# Patient Record
Sex: FEMALE | Race: OTHER | HISPANIC OR LATINO | Employment: UNEMPLOYED | URBAN - METROPOLITAN AREA
[De-identification: names, ages, dates, MRNs, and addresses within clinical notes are randomized per-mention and may not be internally consistent; named-entity substitution may affect disease eponyms.]

---

## 2024-03-25 PROBLEM — O44.40 LOW-LYING PLACENTA: Status: ACTIVE | Noted: 2024-03-25

## 2024-05-08 ENCOUNTER — ANCILLARY PROCEDURE (OUTPATIENT)
Dept: MATERNAL FETAL MEDICINE | Facility: MEDICAL CENTER | Age: 39
End: 2024-05-08
Attending: OBSTETRICS & GYNECOLOGY

## 2024-05-08 ENCOUNTER — HOSPITAL ENCOUNTER (EMERGENCY)
Facility: MEDICAL CENTER | Age: 39
End: 2024-05-08
Attending: OBSTETRICS & GYNECOLOGY | Admitting: OBSTETRICS & GYNECOLOGY
Payer: MEDICAID

## 2024-05-08 VITALS
WEIGHT: 196 LBS | DIASTOLIC BLOOD PRESSURE: 68 MMHG | HEART RATE: 89 BPM | BODY MASS INDEX: 30.76 KG/M2 | HEIGHT: 67 IN | TEMPERATURE: 97.7 F | SYSTOLIC BLOOD PRESSURE: 117 MMHG

## 2024-05-08 VITALS
BODY MASS INDEX: 30.7 KG/M2 | WEIGHT: 196 LBS | DIASTOLIC BLOOD PRESSURE: 78 MMHG | HEART RATE: 90 BPM | SYSTOLIC BLOOD PRESSURE: 122 MMHG

## 2024-05-08 DIAGNOSIS — O26.849 FETAL SIZE INCONSISTENT WITH DATES: ICD-10-CM

## 2024-05-08 DIAGNOSIS — O09.522 MULTIGRAVIDA OF ADVANCED MATERNAL AGE IN SECOND TRIMESTER: ICD-10-CM

## 2024-05-08 DIAGNOSIS — O28.3 ABNORMAL PREGNANCY US: ICD-10-CM

## 2024-05-08 DIAGNOSIS — Z98.890 HISTORY OF LOOP ELECTRICAL EXCISION PROCEDURE (LEEP): ICD-10-CM

## 2024-05-08 DIAGNOSIS — Q27.0 TWO VESSEL CORD: ICD-10-CM

## 2024-05-08 DIAGNOSIS — Z98.891 HISTORY OF VBAC: ICD-10-CM

## 2024-05-08 LAB
APPEARANCE UR: CLEAR
COLOR UR AUTO: YELLOW
GLUCOSE UR QL STRIP.AUTO: NEGATIVE MG/DL
KETONES UR QL STRIP.AUTO: NEGATIVE MG/DL
LEUKOCYTE ESTERASE UR QL STRIP.AUTO: NEGATIVE
NITRITE UR QL STRIP.AUTO: NEGATIVE
PH UR STRIP.AUTO: 7 [PH] (ref 5–8)
PROT UR QL STRIP: 30 MG/DL
RBC UR QL AUTO: NEGATIVE
SP GR UR STRIP.AUTO: 1.02 (ref 1–1.03)

## 2024-05-08 PROCEDURE — 99283 EMERGENCY DEPT VISIT LOW MDM: CPT | Performed by: OBSTETRICS & GYNECOLOGY

## 2024-05-08 PROCEDURE — 59025 FETAL NON-STRESS TEST: CPT

## 2024-05-08 PROCEDURE — 81002 URINALYSIS NONAUTO W/O SCOPE: CPT

## 2024-05-08 PROCEDURE — 99282 EMERGENCY DEPT VISIT SF MDM: CPT | Mod: 25

## 2024-05-08 PROCEDURE — 76816 OB US FOLLOW-UP PER FETUS: CPT | Performed by: OBSTETRICS & GYNECOLOGY

## 2024-05-08 ASSESSMENT — PAIN SCALES - GENERAL: PAINLEVEL: 0 - NO PAIN

## 2024-05-08 NOTE — ED PROVIDER NOTES
LABOR & DELIVERY TRIAGE HISTORY AND PHYSICAL  Patient Name: Tuyet Rojo Age/Sex: 38 y.o. female  : 1985 MRN: [id number]      HISTORY OF PRESENT ILLNESS:   Tuyet Rojo is a 38 y.o.  at 29w3d weeks gestation by L unequal to 16w2d ultrasound who was sent over for loose stools for three weeks and night time vomiting for 3 nights.  [id number] used for encounter. The patient reports that she is tolerating food and water for the most part. No blood in stool. No abdominal pain. She denies fevers or chills. She denies sick contacts or unusual food selections.     Fetal movement: yes  Leakage of Fluid: no  Vaginal Bleeding: no  Contractions: no    Her EDC is 2024, by Ultrasound.   She has received prenatal care with [facility] .  Complications during pregnancy:   Advanced maternal age  Single umbilical artery  Hx of  section followed by a successful     OBSTETRICAL HISTORY:  OB History    Para Term  AB Living   3 2 2     2   SAB IAB Ectopic Molar Multiple Live Births           0 2      # Outcome Date GA Lbr Matt/2nd Weight Sex Delivery Anes PTL Lv   3 Current            2 Term 22 39w3d / 00:50 3.415 kg (7 lb 8.5 oz) M  EPI N DEN   1 Term 19 39w0d  2.268 kg (5 lb) F CS-Unspec EPI N DEN      Birth Comments: C/section due to fetal distress       MEDICAL HISTORY:  Past Medical History:   Diagnosis Date    History of loop electrical excision procedure (LEEP) 2023       SURGICAL HISTORY:  Past Surgical History:   Procedure Laterality Date    PRIMARY C SECTION  2019     [facility]       MEDICATIONS:  Medications Prior to Admission   Medication Sig Dispense Refill Last Dose    metroNIDAZOLE (FLAGYL) 500 MG Tab Take 1 Tablet by mouth 2 times a day. 14 Tablet 0     Prenatal MV-Min-Fe Fum-FA-DHA (PRENATAL 1 PO) Take  by mouth.          ALLERGIES:  Allergies   Allergen Reactions    Penicillins  Swelling    Ketorolac Rash     Rash          SOCIAL HISTORY:   Tobacco use: no  Alcohol use: no  Recreational drug use: no   reports that she has quit smoking. She has never used smokeless tobacco. She reports that she does not currently use alcohol. She reports that she does not use drugs.    FAMILY HISTORY:  Clotting/bleeding disorders: no  Gynecological cancers: no  Family History   Problem Relation Age of Onset    Diabetes Mother     Diabetes Father        REVIEW OF SYSTEMS:  Pertinent items are noted in HPI.      PHYSICAL EXAM:     There is no height or weight on file to calculate BMI.    General:  AAOx3, NAD, pleasant disposition   Pulmonary:  Non-labored respirations   Abdomen: Soft and non-tender, gravid uterus   FHT: 145/mod/+accel/no decel ***   Contractions: No     Bedside Ultrasound:   Not performed     Prenatal Labs:  HepBSAg non-reactive  HIV non-reactive  RPR non-reactive  Rubella immune  ABO A+    Group B Strep: Not yet indicated       ASSESSMENT/ PLAN:   Tuyet Rojo is a 38 y.o.  at 29w3d gestation by L unequal to 16w2d ultrasound who is here for a three week history of loose stools and a three day history of night time vomiting.    1. UA collected and does not show ketones  2. Stool culture ordered  3. She is tolerating oral hydration and crackers currently  4. Anticipate discharge home       Miquel Perea M.D.  Obstetrics and Gynecology  2024    10:50 AM

## 2024-05-08 NOTE — PROGRESS NOTES
EDC - 29 3/7    Pt sent from office for evaluation of vomiting and diarrhea.  used for assessment. Pt reports having loose stools after eating for the past couple of weeks as well as some nausea and vomiting in the evenings. Pt reports good fetal movement, denies vaginal bleeding, leaking of fluid and contractions. EFM/TOCO applied. Report to Dr Perea; provider in room to see pt. Orders received for UA and stool testing. Pt instructed to call RN when able to leave stool sample.    1230- pt states she is still unable to give stool sample. Dr Perea updated. Order received to discharge pt home with instruction to return if diarrhea worsens.     1240- Discharge teaching done with , pt verbalized understanding.

## 2024-05-15 ENCOUNTER — ROUTINE PRENATAL (OUTPATIENT)
Dept: OBGYN | Facility: CLINIC | Age: 39
End: 2024-05-15

## 2024-05-15 VITALS — WEIGHT: 198 LBS | SYSTOLIC BLOOD PRESSURE: 122 MMHG | DIASTOLIC BLOOD PRESSURE: 70 MMHG | BODY MASS INDEX: 31.08 KG/M2

## 2024-05-15 DIAGNOSIS — O09.523 MULTIGRAVIDA OF ADVANCED MATERNAL AGE IN THIRD TRIMESTER: ICD-10-CM

## 2024-05-15 DIAGNOSIS — R19.7 DIARRHEA, UNSPECIFIED TYPE: ICD-10-CM

## 2024-05-15 PROBLEM — O26.849 FETAL SIZE INCONSISTENT WITH DATES: Status: RESOLVED | Noted: 2024-03-21 | Resolved: 2024-05-15

## 2024-05-15 PROCEDURE — 0502F SUBSEQUENT PRENATAL CARE: CPT

## 2024-05-15 PROCEDURE — 3074F SYST BP LT 130 MM HG: CPT

## 2024-05-15 PROCEDURE — 3078F DIAST BP <80 MM HG: CPT

## 2024-05-15 RX ORDER — PRENATAL VIT/IRON FUM/FOLIC AC 27MG-0.8MG
TABLET ORAL
COMMUNITY
Start: 2024-01-16

## 2024-05-15 RX ORDER — FERROUS SULFATE 325(65) MG
325 TABLET ORAL DAILY
Qty: 90 TABLET | Refills: 0 | Status: SHIPPED | OUTPATIENT
Start: 2024-05-15

## 2024-05-15 NOTE — PROGRESS NOTES
"S: Pt is a 38 y.o.  at 30w3d gestation here today for routine prenatal care. Reports still having diarrhea once daily, other BM in a day are \"normal\". Does not have much of an appetite. Went to hospital for stool sample per [facility]'s recommendations, though unable to have BM while there.     Also reporting inability to fall asleep and hemorrhoids.     Pt reports fetal movement; denies cramping, vaginal bleeding, and leaking of fluid.     O: /70   Wt 198 lb    *see prenatal flowsheet*     Labs:        GCT: 150   Ultrasound: EFW 51%, BETITO WNL    A: IUP at 30w3d       S=D         Patient Active Problem List    Diagnosis Date Noted    Two vessel cord 2024    S<D 2024    History of  2024    Multigravida of advanced maternal age in second trimester 2024    History of loop electrical excision procedure (LEEP) 2023     P:   -Discussed normal s/sx pregnancy appropriate for gestational age and labor warning signs vs general discomforts. Reviewed fetal movements appropriate for EGA and kick counts. Reinforced adequate hydration and nutrition.  -Rx for ferrous sulfate  -Disc benadryl for sleep, preparation H for hemorrhoids  -Called lab, pt will go to  sample cup from lab today and return it with stool sample when possible. Lab pending from hospital ([facility]'s recommendations)  -Has f/u scan with [facility]   -RTC in 2 weeks for routine prenatal care      Kerri Schilling C.N.M.    "

## 2024-05-15 NOTE — PROGRESS NOTES
Pt here today for OBFV   +FM movemnet  No VB, LOF. 's   Phone number [phone] (home)   Pharmacy verified   MFM- 6/5  ER- 5/8 for vomiting and diarrhea

## 2024-05-21 ENCOUNTER — HOSPITAL ENCOUNTER (OUTPATIENT)
Facility: MEDICAL CENTER | Age: 39
End: 2024-05-21
Attending: OBSTETRICS & GYNECOLOGY
Payer: COMMERCIAL

## 2024-05-22 LAB
E COLI SXT1+2 STL IA: NORMAL
SIGNIFICANT IND 70042: NORMAL
SITE SITE: NORMAL
SOURCE SOURCE: NORMAL

## 2024-05-24 LAB
BACTERIA STL CULT: NORMAL
E COLI SXT1+2 STL IA: NORMAL
SIGNIFICANT IND 70042: NORMAL
SITE SITE: NORMAL
SOURCE SOURCE: NORMAL

## 2024-05-28 ENCOUNTER — ROUTINE PRENATAL (OUTPATIENT)
Dept: OBGYN | Facility: CLINIC | Age: 39
End: 2024-05-28
Payer: MEDICAID

## 2024-05-28 VITALS — BODY MASS INDEX: 30.92 KG/M2 | SYSTOLIC BLOOD PRESSURE: 118 MMHG | DIASTOLIC BLOOD PRESSURE: 74 MMHG | WEIGHT: 197 LBS

## 2024-05-28 DIAGNOSIS — O09.523 MULTIGRAVIDA OF ADVANCED MATERNAL AGE IN THIRD TRIMESTER: ICD-10-CM

## 2024-05-28 NOTE — PROGRESS NOTES
Pt here today for OB follow up  Pt states she is feeling tired.   Reports +FM  Good # [phone]  Pharmacy Confirmed.  Chaperone offered and not indicated.   Pt has an appt with MFM on 6/5/2024  Pt would like BTL, consent signed

## 2024-05-28 NOTE — PROGRESS NOTES
S: ***    O: Vitals see flows   ***   Pertinent Lab Results: negative stool culture    A: Tuyet Rojo IUP at 32w2d     Patient Active Problem List    Diagnosis Date Noted    Two vessel cord 2024    History of  2024    Multigravida of advanced maternal age in second trimester 2024    History of loop electrical excision procedure (LEEP) 2023      ***    P: Studies/Labs to order today: ***   Studies/Labsfor next visit: ***   MFM scan f/u    Follow-up: 2 wks    Precautions reviewed with patient appropriate for gestational age.

## 2024-05-28 NOTE — PROGRESS NOTES
SUBJECTIVE:  Pt is a 38 y.o.   at 32w2d  gestation. Presents today for follow-up prenatal care. Reports good  fetal movement. Denies regular cramping/contractions, bleeding or leaking of fluid. Denies dysuria, headaches, N/V. Generally feels well today.      OBJECTIVE:  - See prenatal vitals flow  -   Vitals:    24 1019   BP: 118/74   Weight: 197 lb                 ASSESSMENT:   - IUP at 32w2d    - S=D   -   Patient Active Problem List    Diagnosis Date Noted    Two vessel cord 2024    History of  2024    Multigravida of advanced maternal age in third trimester 2024    History of loop electrical excision procedure (LEEP) 2023         PLAN:  - S/sx pregnancy and labor warning signs vs general discomforts discussed  - Fetal movements and/or kick counts reviewed   - Adequate hydration reinforced  - Nutrition/exercise/vitamin education; continue PNV  - Oki follow up in   - Plans BTL for contraception Pp: handout given and reviewed  - Anticipatory guidance given  - RTC in 2 weeks for follow-up prenatal care/TOLAC counseling

## 2024-06-05 ENCOUNTER — ANCILLARY PROCEDURE (OUTPATIENT)
Dept: MATERNAL FETAL MEDICINE | Facility: MEDICAL CENTER | Age: 39
End: 2024-06-05

## 2024-06-05 VITALS
BODY MASS INDEX: 31.09 KG/M2 | HEART RATE: 102 BPM | WEIGHT: 198.1 LBS | SYSTOLIC BLOOD PRESSURE: 100 MMHG | DIASTOLIC BLOOD PRESSURE: 63 MMHG

## 2024-06-05 DIAGNOSIS — Z98.891 HISTORY OF VBAC: ICD-10-CM

## 2024-06-05 DIAGNOSIS — O09.523 ADVANCED MATERNAL AGE IN MULTIGRAVIDA, THIRD TRIMESTER: ICD-10-CM

## 2024-06-05 DIAGNOSIS — Z98.890 HISTORY OF LOOP ELECTRICAL EXCISION PROCEDURE (LEEP): ICD-10-CM

## 2024-06-05 DIAGNOSIS — Q27.0 TWO VESSEL CORD: ICD-10-CM

## 2024-06-05 DIAGNOSIS — O09.523 MULTIGRAVIDA OF ADVANCED MATERNAL AGE IN THIRD TRIMESTER: ICD-10-CM

## 2024-06-05 PROCEDURE — 76816 OB US FOLLOW-UP PER FETUS: CPT | Performed by: OBSTETRICS & GYNECOLOGY

## 2024-06-11 ENCOUNTER — ROUTINE PRENATAL (OUTPATIENT)
Dept: OBGYN | Facility: CLINIC | Age: 39
End: 2024-06-11

## 2024-06-11 VITALS — SYSTOLIC BLOOD PRESSURE: 104 MMHG | DIASTOLIC BLOOD PRESSURE: 60 MMHG | BODY MASS INDEX: 31.14 KG/M2 | WEIGHT: 198.4 LBS

## 2024-06-11 DIAGNOSIS — O34.219 PREVIOUS CESAREAN DELIVERY AFFECTING PREGNANCY, ANTEPARTUM: ICD-10-CM

## 2024-06-11 DIAGNOSIS — O09.523 MULTIGRAVIDA OF ADVANCED MATERNAL AGE IN THIRD TRIMESTER: ICD-10-CM

## 2024-06-11 DIAGNOSIS — O09.899 SINGLE UMBILICAL ARTERY AFFECTING MANAGEMENT OF MOTHER, ANTEPARTUM, SINGLE GESTATION: ICD-10-CM

## 2024-06-11 PROCEDURE — 3074F SYST BP LT 130 MM HG: CPT | Performed by: OBSTETRICS & GYNECOLOGY

## 2024-06-11 PROCEDURE — 3078F DIAST BP <80 MM HG: CPT | Performed by: OBSTETRICS & GYNECOLOGY

## 2024-06-11 PROCEDURE — 0502F SUBSEQUENT PRENATAL CARE: CPT | Performed by: OBSTETRICS & GYNECOLOGY

## 2024-06-11 NOTE — PROGRESS NOTES
OB Visit Note - 34w2d     MEDICAL DECISION MAKING:  Tuyet Rojo is a 38 y.o. female  at 34w2d.  The patient reported good fetal movement, no vaginal bleeding, mild yellowish discharge when not hydrated.  The patient wished to discuss a trial of labor after previous  delivery.  She has had a previously successful .    Today's visit addressed:   1.  Advanced maternal age  2.  Previous  section  3.  Single umbilical artery noted on ultrasound    Pregnancy complicated by:  Patient Active Problem List   Diagnosis    History of     History of loop electrical excision procedure (LEEP)    Multigravida of advanced maternal age in third trimester    Two vessel cord       The patient will follow up in 1 week for repeat OB visit and NST.  With a history of a previous successful , she should be a candidate for another trial of labor as long as her fetal monitoring is reassuring.  She was counseled to call or return for vaginal bleeding, regular contractions, leakage of fluid or decreased fetal movement.    Swapnil Chand M.D.

## 2024-06-11 NOTE — PROGRESS NOTES
Pt. Here for OB/FU.   34w2d  Reports Good FM.   Pt. Denies VB, LOF, or UC's.     Pt states she is getting some yellow and orange discharge no odor or itchiness, but seems to resolve with hydration. No other complaints today     Phone/Pharm verified.      Interpretor ID : [id number]

## 2024-06-18 ENCOUNTER — NON-PROVIDER VISIT (OUTPATIENT)
Dept: OBGYN | Facility: CLINIC | Age: 39
End: 2024-06-18
Payer: MEDICAID

## 2024-06-18 VITALS
DIASTOLIC BLOOD PRESSURE: 72 MMHG | HEIGHT: 67 IN | WEIGHT: 199.2 LBS | SYSTOLIC BLOOD PRESSURE: 127 MMHG | BODY MASS INDEX: 31.27 KG/M2

## 2024-06-18 DIAGNOSIS — O09.523 MULTIGRAVIDA OF ADVANCED MATERNAL AGE IN THIRD TRIMESTER: ICD-10-CM

## 2024-06-18 PROCEDURE — 59025 FETAL NON-STRESS TEST: CPT | Performed by: NURSE PRACTITIONER

## 2024-06-26 ENCOUNTER — ROUTINE PRENATAL (OUTPATIENT)
Dept: OBGYN | Facility: CLINIC | Age: 39
End: 2024-06-26

## 2024-06-26 ENCOUNTER — HOSPITAL ENCOUNTER (OUTPATIENT)
Facility: MEDICAL CENTER | Age: 39
End: 2024-06-26
Payer: COMMERCIAL

## 2024-06-26 ENCOUNTER — NON-PROVIDER VISIT (OUTPATIENT)
Dept: OBGYN | Facility: CLINIC | Age: 39
End: 2024-06-26

## 2024-06-26 VITALS — BODY MASS INDEX: 31.39 KG/M2 | SYSTOLIC BLOOD PRESSURE: 104 MMHG | DIASTOLIC BLOOD PRESSURE: 85 MMHG | WEIGHT: 200 LBS

## 2024-06-26 DIAGNOSIS — Z34.83 PRENATAL CARE, SUBSEQUENT PREGNANCY, THIRD TRIMESTER: ICD-10-CM

## 2024-06-26 PROCEDURE — 3074F SYST BP LT 130 MM HG: CPT

## 2024-06-26 PROCEDURE — 0502F SUBSEQUENT PRENATAL CARE: CPT

## 2024-06-26 PROCEDURE — 3079F DIAST BP 80-89 MM HG: CPT

## 2024-06-26 NOTE — PROGRESS NOTES
S:  Pt is  at 36w3d here for routine OB follow up. Reports she has been under a lot of stress lately due to her 2 year old being extra clingy and requiring a lot of her attention. Reports they have been working with speech and occupational therapy for suspicion of autism but states child is too young to be diagnosed. States she has worked with a therapist in the past for coping and is interesting in restarting therapy at this time. No ED or hospital visits since last seen. Reports good FM. Denies VB, LOF, RUCs, or vaginal DC.    O:  See above vitals        Fetal position: vertex        SVE - FT/thick/floating        NST - reactive per my read    Baseline: 135  Variability: moderate  Accelerations: present  Decelerations: variable x1  [place]: quiet       A:  IUP at 36w3d  Patient Active Problem List    Diagnosis Date Noted    Two vessel cord 2024    History of  2024    Multigravida of advanced maternal age in third trimester 2024    History of loop electrical excision procedure (LEEP) 2023       P:  1.  Continue FKCs.         2.  Labor precautions given. Instructions given on where to go. Pt receptive to education.         3.  TOLAC counseling on  with Dr. Chand - good candidate for TOLAC.        4.  Encouraged adequate water intake, walking 30-60 minutes daily, bounce ball, pelvic rocking       5.  GBS today       6.  Behavioral health referral placed today       7.  F/u 1wk with NST

## 2024-06-26 NOTE — PROGRESS NOTES
Pt here today for OB follow up  Pt states no complaints  Reports +FM  Good # [phone] (home)    Pharmacy Confirmed.  Chaperone offered and declined.    GBS today

## 2024-06-28 LAB — GP B STREP DNA SPEC QL NAA+PROBE: NEGATIVE

## 2024-07-02 ENCOUNTER — ROUTINE PRENATAL (OUTPATIENT)
Dept: OBGYN | Facility: CLINIC | Age: 39
End: 2024-07-02

## 2024-07-02 ENCOUNTER — NON-PROVIDER VISIT (OUTPATIENT)
Dept: OBGYN | Facility: CLINIC | Age: 39
End: 2024-07-02

## 2024-07-02 ENCOUNTER — APPOINTMENT (OUTPATIENT)
Dept: RADIOLOGY | Facility: IMAGING CENTER | Age: 39
End: 2024-07-02

## 2024-07-02 VITALS — WEIGHT: 200.8 LBS | DIASTOLIC BLOOD PRESSURE: 60 MMHG | SYSTOLIC BLOOD PRESSURE: 119 MMHG | BODY MASS INDEX: 31.52 KG/M2

## 2024-07-02 DIAGNOSIS — Z34.83 PRENATAL CARE, SUBSEQUENT PREGNANCY, THIRD TRIMESTER: ICD-10-CM

## 2024-07-02 DIAGNOSIS — O41.03X1 OLIGOHYDRAMNIOS IN THIRD TRIMESTER, FETUS 1 OF MULTIPLE GESTATION: ICD-10-CM

## 2024-07-02 PROCEDURE — 3074F SYST BP LT 130 MM HG: CPT

## 2024-07-02 PROCEDURE — 76819 FETAL BIOPHYS PROFIL W/O NST: CPT | Mod: TC

## 2024-07-02 PROCEDURE — 3078F DIAST BP <80 MM HG: CPT

## 2024-07-02 PROCEDURE — 0502F SUBSEQUENT PRENATAL CARE: CPT

## 2024-07-09 ENCOUNTER — ROUTINE PRENATAL (OUTPATIENT)
Dept: OBGYN | Facility: CLINIC | Age: 39
End: 2024-07-09

## 2024-07-09 ENCOUNTER — NON-PROVIDER VISIT (OUTPATIENT)
Dept: OBGYN | Facility: CLINIC | Age: 39
End: 2024-07-09

## 2024-07-09 VITALS — SYSTOLIC BLOOD PRESSURE: 123 MMHG | BODY MASS INDEX: 31.86 KG/M2 | WEIGHT: 203 LBS | DIASTOLIC BLOOD PRESSURE: 66 MMHG

## 2024-07-09 VITALS — BODY MASS INDEX: 31.86 KG/M2 | WEIGHT: 203 LBS | DIASTOLIC BLOOD PRESSURE: 66 MMHG | SYSTOLIC BLOOD PRESSURE: 123 MMHG

## 2024-07-09 DIAGNOSIS — O34.219 PREVIOUS CESAREAN DELIVERY AFFECTING PREGNANCY, ANTEPARTUM: ICD-10-CM

## 2024-07-09 PROCEDURE — 3074F SYST BP LT 130 MM HG: CPT | Performed by: OBSTETRICS & GYNECOLOGY

## 2024-07-09 PROCEDURE — 3078F DIAST BP <80 MM HG: CPT | Performed by: OBSTETRICS & GYNECOLOGY

## 2024-07-09 PROCEDURE — 0502F SUBSEQUENT PRENATAL CARE: CPT | Performed by: OBSTETRICS & GYNECOLOGY

## 2024-07-12 ENCOUNTER — NON-PROVIDER VISIT (OUTPATIENT)
Dept: OBGYN | Facility: CLINIC | Age: 39
End: 2024-07-12

## 2024-07-12 VITALS — WEIGHT: 204 LBS | DIASTOLIC BLOOD PRESSURE: 70 MMHG | BODY MASS INDEX: 32.02 KG/M2 | SYSTOLIC BLOOD PRESSURE: 121 MMHG

## 2024-07-12 PROCEDURE — 99999 PR NO CHARGE: CPT

## 2024-07-16 ENCOUNTER — NON-PROVIDER VISIT (OUTPATIENT)
Dept: OBGYN | Facility: CLINIC | Age: 39
End: 2024-07-16

## 2024-07-16 ENCOUNTER — APPOINTMENT (OUTPATIENT)
Dept: OBGYN | Facility: CLINIC | Age: 39
End: 2024-07-16

## 2024-07-16 VITALS — SYSTOLIC BLOOD PRESSURE: 110 MMHG | DIASTOLIC BLOOD PRESSURE: 62 MMHG | BODY MASS INDEX: 31.86 KG/M2 | WEIGHT: 203 LBS

## 2024-07-16 DIAGNOSIS — O09.523 MULTIGRAVIDA OF ADVANCED MATERNAL AGE IN THIRD TRIMESTER: ICD-10-CM

## 2024-07-16 PROCEDURE — 0502F SUBSEQUENT PRENATAL CARE: CPT

## 2024-07-19 ENCOUNTER — HOSPITAL ENCOUNTER (INPATIENT)
Facility: MEDICAL CENTER | Age: 39
LOS: 2 days | End: 2024-07-21
Attending: OBSTETRICS & GYNECOLOGY | Admitting: OBSTETRICS & GYNECOLOGY
Payer: MEDICAID

## 2024-07-19 ENCOUNTER — APPOINTMENT (OUTPATIENT)
Dept: OBGYN | Facility: MEDICAL CENTER | Age: 39
End: 2024-07-19
Attending: OBSTETRICS & GYNECOLOGY
Payer: MEDICAID

## 2024-07-19 ENCOUNTER — NON-PROVIDER VISIT (OUTPATIENT)
Dept: OBGYN | Facility: CLINIC | Age: 39
End: 2024-07-19

## 2024-07-19 ENCOUNTER — ROUTINE PRENATAL (OUTPATIENT)
Dept: OBGYN | Facility: CLINIC | Age: 39
End: 2024-07-19

## 2024-07-19 VITALS — DIASTOLIC BLOOD PRESSURE: 62 MMHG | BODY MASS INDEX: 31.86 KG/M2 | WEIGHT: 203 LBS | SYSTOLIC BLOOD PRESSURE: 110 MMHG

## 2024-07-19 DIAGNOSIS — O09.523 MULTIGRAVIDA OF ADVANCED MATERNAL AGE IN THIRD TRIMESTER: ICD-10-CM

## 2024-07-19 LAB
BASOPHILS # BLD AUTO: 0.4 % (ref 0–1.8)
BASOPHILS # BLD: 0.03 K/UL (ref 0–0.12)
EOSINOPHIL # BLD AUTO: 0.07 K/UL (ref 0–0.51)
EOSINOPHIL NFR BLD: 0.9 % (ref 0–6.9)
ERYTHROCYTE [DISTWIDTH] IN BLOOD BY AUTOMATED COUNT: 42.6 FL (ref 35.9–50)
HCT VFR BLD AUTO: 33 % (ref 37–47)
HGB BLD-MCNC: 11.2 G/DL (ref 12–16)
HOLDING TUBE BB 8507: NORMAL
IMM GRANULOCYTES # BLD AUTO: 0.06 K/UL (ref 0–0.11)
IMM GRANULOCYTES NFR BLD AUTO: 0.8 % (ref 0–0.9)
LYMPHOCYTES # BLD AUTO: 2.31 K/UL (ref 1–4.8)
LYMPHOCYTES NFR BLD: 29.6 % (ref 22–41)
MCH RBC QN AUTO: 28.6 PG (ref 27–33)
MCHC RBC AUTO-ENTMCNC: 33.9 G/DL (ref 32.2–35.5)
MCV RBC AUTO: 84.4 FL (ref 81.4–97.8)
MONOCYTES # BLD AUTO: 0.44 K/UL (ref 0–0.85)
MONOCYTES NFR BLD AUTO: 5.6 % (ref 0–13.4)
NEUTROPHILS # BLD AUTO: 4.89 K/UL (ref 1.82–7.42)
NEUTROPHILS NFR BLD: 62.7 % (ref 44–72)
NRBC # BLD AUTO: 0 K/UL
NRBC BLD-RTO: 0 /100 WBC (ref 0–0.2)
PLATELET # BLD AUTO: 203 K/UL (ref 164–446)
PMV BLD AUTO: 12 FL (ref 9–12.9)
RBC # BLD AUTO: 3.91 M/UL (ref 4.2–5.4)
T PALLIDUM AB SER QL IA: NORMAL
WBC # BLD AUTO: 7.8 K/UL (ref 4.8–10.8)

## 2024-07-19 PROCEDURE — 700105 HCHG RX REV CODE 258

## 2024-07-19 PROCEDURE — 3078F DIAST BP <80 MM HG: CPT | Performed by: MIDWIFE

## 2024-07-19 PROCEDURE — 3074F SYST BP LT 130 MM HG: CPT | Performed by: MIDWIFE

## 2024-07-19 PROCEDURE — 85025 COMPLETE CBC W/AUTO DIFF WBC: CPT

## 2024-07-19 PROCEDURE — 86780 TREPONEMA PALLIDUM: CPT

## 2024-07-19 PROCEDURE — 0502F SUBSEQUENT PRENATAL CARE: CPT | Performed by: MIDWIFE

## 2024-07-19 PROCEDURE — 3E033VJ INTRODUCTION OF OTHER HORMONE INTO PERIPHERAL VEIN, PERCUTANEOUS APPROACH: ICD-10-PCS

## 2024-07-19 PROCEDURE — 700111 HCHG RX REV CODE 636 W/ 250 OVERRIDE (IP)

## 2024-07-19 PROCEDURE — 36415 COLL VENOUS BLD VENIPUNCTURE: CPT

## 2024-07-19 PROCEDURE — 770002 HCHG ROOM/CARE - OB PRIVATE (112)

## 2024-07-19 RX ORDER — TERBUTALINE SULFATE 1 MG/ML
0.25 INJECTION, SOLUTION SUBCUTANEOUS
Status: DISCONTINUED | OUTPATIENT
Start: 2024-07-19 | End: 2024-07-20 | Stop reason: HOSPADM

## 2024-07-19 RX ORDER — ONDANSETRON 2 MG/ML
4 INJECTION INTRAMUSCULAR; INTRAVENOUS EVERY 6 HOURS PRN
Status: DISCONTINUED | OUTPATIENT
Start: 2024-07-19 | End: 2024-07-20 | Stop reason: HOSPADM

## 2024-07-19 RX ORDER — ACETAMINOPHEN 500 MG
1000 TABLET ORAL
Status: DISCONTINUED | OUTPATIENT
Start: 2024-07-19 | End: 2024-07-20 | Stop reason: HOSPADM

## 2024-07-19 RX ORDER — SODIUM CHLORIDE, SODIUM LACTATE, POTASSIUM CHLORIDE, CALCIUM CHLORIDE 600; 310; 30; 20 MG/100ML; MG/100ML; MG/100ML; MG/100ML
INJECTION, SOLUTION INTRAVENOUS CONTINUOUS
Status: DISCONTINUED | OUTPATIENT
Start: 2024-07-19 | End: 2024-07-20 | Stop reason: HOSPADM

## 2024-07-19 RX ORDER — LIDOCAINE HYDROCHLORIDE 10 MG/ML
20 INJECTION, SOLUTION INFILTRATION; PERINEURAL
Status: DISCONTINUED | OUTPATIENT
Start: 2024-07-19 | End: 2024-07-20 | Stop reason: HOSPADM

## 2024-07-19 RX ORDER — OXYTOCIN 10 [USP'U]/ML
10 INJECTION, SOLUTION INTRAMUSCULAR; INTRAVENOUS
Status: DISCONTINUED | OUTPATIENT
Start: 2024-07-19 | End: 2024-07-20 | Stop reason: HOSPADM

## 2024-07-19 RX ORDER — IBUPROFEN 800 MG/1
800 TABLET, FILM COATED ORAL
Status: DISCONTINUED | OUTPATIENT
Start: 2024-07-19 | End: 2024-07-20 | Stop reason: HOSPADM

## 2024-07-19 RX ORDER — METOCLOPRAMIDE HYDROCHLORIDE 5 MG/ML
10 INJECTION INTRAMUSCULAR; INTRAVENOUS EVERY 6 HOURS PRN
Status: DISCONTINUED | OUTPATIENT
Start: 2024-07-19 | End: 2024-07-20 | Stop reason: HOSPADM

## 2024-07-19 RX ORDER — ONDANSETRON 4 MG/1
4 TABLET, ORALLY DISINTEGRATING ORAL EVERY 6 HOURS PRN
Status: DISCONTINUED | OUTPATIENT
Start: 2024-07-19 | End: 2024-07-20 | Stop reason: HOSPADM

## 2024-07-19 RX ADMIN — OXYTOCIN 2 MILLI-UNITS/MIN: 10 INJECTION, SOLUTION INTRAMUSCULAR; INTRAVENOUS at 22:52

## 2024-07-19 RX ADMIN — SODIUM CHLORIDE, POTASSIUM CHLORIDE, SODIUM LACTATE AND CALCIUM CHLORIDE: 600; 310; 30; 20 INJECTION, SOLUTION INTRAVENOUS at 22:52

## 2024-07-19 ASSESSMENT — PATIENT HEALTH QUESTIONNAIRE - PHQ9
2. FEELING DOWN, DEPRESSED, IRRITABLE, OR HOPELESS: NOT AT ALL
1. LITTLE INTEREST OR PLEASURE IN DOING THINGS: NOT AT ALL
SUM OF ALL RESPONSES TO PHQ9 QUESTIONS 1 AND 2: 0

## 2024-07-19 ASSESSMENT — PAIN DESCRIPTION - PAIN TYPE
TYPE: ACUTE PAIN
TYPE: ACUTE PAIN

## 2024-07-19 ASSESSMENT — PAIN SCALES - GENERAL: PAINLEVEL: 0 - NO PAIN

## 2024-07-19 ASSESSMENT — LIFESTYLE VARIABLES: EVER_SMOKED: NEVER

## 2024-07-20 ENCOUNTER — ANESTHESIA EVENT (OUTPATIENT)
Dept: ANESTHESIOLOGY | Facility: MEDICAL CENTER | Age: 39
End: 2024-07-20
Payer: MEDICAID

## 2024-07-20 ENCOUNTER — ANESTHESIA (OUTPATIENT)
Dept: ANESTHESIOLOGY | Facility: MEDICAL CENTER | Age: 39
End: 2024-07-20
Payer: MEDICAID

## 2024-07-20 PROCEDURE — 770002 HCHG ROOM/CARE - OB PRIVATE (112)

## 2024-07-20 PROCEDURE — 700111 HCHG RX REV CODE 636 W/ 250 OVERRIDE (IP): Performed by: STUDENT IN AN ORGANIZED HEALTH CARE EDUCATION/TRAINING PROGRAM

## 2024-07-20 PROCEDURE — 304965 HCHG RECOVERY SERVICES

## 2024-07-20 PROCEDURE — 700111 HCHG RX REV CODE 636 W/ 250 OVERRIDE (IP)

## 2024-07-20 PROCEDURE — 0HQ9XZZ REPAIR PERINEUM SKIN, EXTERNAL APPROACH: ICD-10-PCS | Performed by: OBSTETRICS & GYNECOLOGY

## 2024-07-20 PROCEDURE — 59409 OBSTETRICAL CARE: CPT

## 2024-07-20 PROCEDURE — A9270 NON-COVERED ITEM OR SERVICE: HCPCS

## 2024-07-20 PROCEDURE — 700111 HCHG RX REV CODE 636 W/ 250 OVERRIDE (IP): Mod: JZ

## 2024-07-20 PROCEDURE — 700105 HCHG RX REV CODE 258

## 2024-07-20 PROCEDURE — 303615 HCHG EPIDURAL/SPINAL ANESTHESIA FOR LABOR

## 2024-07-20 PROCEDURE — 700102 HCHG RX REV CODE 250 W/ 637 OVERRIDE(OP)

## 2024-07-20 PROCEDURE — 700101 HCHG RX REV CODE 250: Performed by: STUDENT IN AN ORGANIZED HEALTH CARE EDUCATION/TRAINING PROGRAM

## 2024-07-20 RX ORDER — ACETAMINOPHEN 500 MG
1000 TABLET ORAL EVERY 6 HOURS PRN
Status: DISCONTINUED | OUTPATIENT
Start: 2024-07-20 | End: 2024-07-21 | Stop reason: HOSPADM

## 2024-07-20 RX ORDER — ROPIVACAINE HYDROCHLORIDE 2 MG/ML
INJECTION, SOLUTION EPIDURAL; INFILTRATION
Status: COMPLETED | OUTPATIENT
Start: 2024-07-20 | End: 2024-07-20

## 2024-07-20 RX ORDER — ROPIVACAINE HYDROCHLORIDE 2 MG/ML
INJECTION, SOLUTION EPIDURAL; INFILTRATION; PERINEURAL CONTINUOUS
Status: DISCONTINUED | OUTPATIENT
Start: 2024-07-20 | End: 2024-07-20

## 2024-07-20 RX ORDER — SODIUM CHLORIDE, SODIUM LACTATE, POTASSIUM CHLORIDE, AND CALCIUM CHLORIDE .6; .31; .03; .02 G/100ML; G/100ML; G/100ML; G/100ML
1000 INJECTION, SOLUTION INTRAVENOUS
Status: DISCONTINUED | OUTPATIENT
Start: 2024-07-20 | End: 2024-07-20

## 2024-07-20 RX ORDER — ROPIVACAINE HYDROCHLORIDE 2 MG/ML
INJECTION, SOLUTION EPIDURAL; INFILTRATION; PERINEURAL
Status: COMPLETED
Start: 2024-07-20 | End: 2024-07-20

## 2024-07-20 RX ORDER — SODIUM CHLORIDE, SODIUM LACTATE, POTASSIUM CHLORIDE, CALCIUM CHLORIDE 600; 310; 30; 20 MG/100ML; MG/100ML; MG/100ML; MG/100ML
INJECTION, SOLUTION INTRAVENOUS PRN
Status: DISCONTINUED | OUTPATIENT
Start: 2024-07-20 | End: 2024-07-21 | Stop reason: HOSPADM

## 2024-07-20 RX ORDER — LIDOCAINE HYDROCHLORIDE AND EPINEPHRINE 15; 5 MG/ML; UG/ML
INJECTION, SOLUTION EPIDURAL
Status: COMPLETED | OUTPATIENT
Start: 2024-07-20 | End: 2024-07-20

## 2024-07-20 RX ORDER — CARBOPROST TROMETHAMINE 250 UG/ML
250 INJECTION, SOLUTION INTRAMUSCULAR
Status: DISCONTINUED | OUTPATIENT
Start: 2024-07-20 | End: 2024-07-20 | Stop reason: HOSPADM

## 2024-07-20 RX ORDER — METHYLERGONOVINE MALEATE 0.2 MG/ML
0.2 INJECTION INTRAVENOUS
Status: DISCONTINUED | OUTPATIENT
Start: 2024-07-20 | End: 2024-07-20 | Stop reason: HOSPADM

## 2024-07-20 RX ORDER — PHYTONADIONE 2 MG/ML
INJECTION, EMULSION INTRAMUSCULAR; INTRAVENOUS; SUBCUTANEOUS
Status: DISCONTINUED
Start: 2024-07-20 | End: 2024-07-20

## 2024-07-20 RX ORDER — MISOPROSTOL 200 UG/1
800 TABLET ORAL
Status: DISCONTINUED | OUTPATIENT
Start: 2024-07-20 | End: 2024-07-20 | Stop reason: HOSPADM

## 2024-07-20 RX ORDER — DOCUSATE SODIUM 100 MG/1
100 CAPSULE, LIQUID FILLED ORAL 2 TIMES DAILY PRN
Status: DISCONTINUED | OUTPATIENT
Start: 2024-07-20 | End: 2024-07-21 | Stop reason: HOSPADM

## 2024-07-20 RX ORDER — EPHEDRINE SULFATE 50 MG/ML
5 INJECTION, SOLUTION INTRAVENOUS
Status: DISCONTINUED | OUTPATIENT
Start: 2024-07-20 | End: 2024-07-20

## 2024-07-20 RX ORDER — SODIUM CHLORIDE, SODIUM LACTATE, POTASSIUM CHLORIDE, AND CALCIUM CHLORIDE .6; .31; .03; .02 G/100ML; G/100ML; G/100ML; G/100ML
250 INJECTION, SOLUTION INTRAVENOUS PRN
Status: DISCONTINUED | OUTPATIENT
Start: 2024-07-20 | End: 2024-07-20

## 2024-07-20 RX ORDER — IBUPROFEN 800 MG/1
800 TABLET, FILM COATED ORAL EVERY 8 HOURS PRN
Status: DISCONTINUED | OUTPATIENT
Start: 2024-07-20 | End: 2024-07-21 | Stop reason: HOSPADM

## 2024-07-20 RX ORDER — ERYTHROMYCIN 5 MG/G
OINTMENT OPHTHALMIC
Status: DISCONTINUED
Start: 2024-07-20 | End: 2024-07-20

## 2024-07-20 RX ADMIN — FENTANYL CITRATE 100 MCG: 50 INJECTION, SOLUTION INTRAMUSCULAR; INTRAVENOUS at 06:57

## 2024-07-20 RX ADMIN — OXYTOCIN 125 ML/HR: 10 INJECTION, SOLUTION INTRAMUSCULAR; INTRAVENOUS at 14:39

## 2024-07-20 RX ADMIN — DOCUSATE SODIUM 100 MG: 100 CAPSULE, LIQUID FILLED ORAL at 19:41

## 2024-07-20 RX ADMIN — LIDOCAINE HYDROCHLORIDE,EPINEPHRINE BITARTRATE 3 ML: 15; .005 INJECTION, SOLUTION EPIDURAL; INFILTRATION; INTRACAUDAL; PERINEURAL at 07:31

## 2024-07-20 RX ADMIN — ROPIVACAINE HYDROCHLORIDE 200 MG: 2 INJECTION, SOLUTION EPIDURAL; INFILTRATION; PERINEURAL at 07:33

## 2024-07-20 RX ADMIN — OXYTOCIN 125 ML/HR: 10 INJECTION, SOLUTION INTRAMUSCULAR; INTRAVENOUS at 17:07

## 2024-07-20 RX ADMIN — FENTANYL CITRATE 100 MCG: 50 INJECTION, SOLUTION INTRAMUSCULAR; INTRAVENOUS at 05:45

## 2024-07-20 RX ADMIN — ROPIVACAINE HYDROCHLORIDE 10 ML: 5 INJECTION, SOLUTION EPIDURAL; INFILTRATION; PERINEURAL at 07:35

## 2024-07-20 RX ADMIN — SODIUM CHLORIDE, POTASSIUM CHLORIDE, SODIUM LACTATE AND CALCIUM CHLORIDE: 600; 310; 30; 20 INJECTION, SOLUTION INTRAVENOUS at 07:33

## 2024-07-20 RX ADMIN — OXYTOCIN 20 UNITS: 10 INJECTION, SOLUTION INTRAMUSCULAR; INTRAVENOUS at 13:20

## 2024-07-20 RX ADMIN — IBUPROFEN 800 MG: 800 TABLET, FILM COATED ORAL at 19:41

## 2024-07-20 ASSESSMENT — PAIN DESCRIPTION - PAIN TYPE
TYPE: ACUTE PAIN

## 2024-07-20 ASSESSMENT — PATIENT HEALTH QUESTIONNAIRE - PHQ9
1. LITTLE INTEREST OR PLEASURE IN DOING THINGS: NOT AT ALL
2. FEELING DOWN, DEPRESSED, IRRITABLE, OR HOPELESS: NOT AT ALL
SUM OF ALL RESPONSES TO PHQ9 QUESTIONS 1 AND 2: 0

## 2024-07-20 NOTE — CARE PLAN
The patient is Stable - Low risk of patient condition declining or worsening    Problem: Risk for Infection and Impaired Wound Healing  Goal: Patient will remain free from infection  Description: Target End Date:  1 to 3 days    1.  Utilize Standard Precautions at all times to reduce the risk of transmission of microorganisms from both recognized and unrecognized sources of infection  2.  Infection prevention handouts provided (general/device/diagnosis specific) and documented in Patient Education  3.  Limit vaginal exams as necessary  4.  Use aseptic technique during vaginal exams  5.  Administer antibiotic therapy per provider order  6.  Assess for removal of lines and drains  7.  Line/drain care per policy and/or provider orders  Outcome: Progressing     Problem: Pain  Goal: Patient's pain will be alleviated or reduced to the patient’s comfort goal  Description: Target End Date:  Prior to discharge or change in level of care    1.  Document pain using the appropriate pain scale per order or unit policy  2.  Administer pain medications per provider order and/or assist with epidural/spinal placement as needed  3.  Pain management medications as ordered  4.  Educate and implement non-pharmacologic comfort measures (i.e. relaxation, distraction, massage, cold/heat therapy, etc.)  5.  Assess for nonverbal signs of ineffective coping with pain and offer pain medication and/or epidural anesthesia  Outcome: Progressing

## 2024-07-20 NOTE — L&D DELIVERY NOTE
Vaginal Delivery Procedure Note:    Tuyet Rojo is a 38 y.o. , admitted for scheduled IOL d/t AMA.  H/o 1x successful . Her labor course was uncomplicated, pain controlled well with epidural anesthesia.    PreDelivery Diagnosis:  1. SIUP @ 39w6d  2. AMA  3. H/o  with successful  x1    PostDelivery Diagnosis:  1. S/p       Procedure in Detail:  Pt pushing dorsal lithotomy position.  Head delivered easily and restituted towards maternal Right.  Anterior shoulder followed easily with gentle downwards pressure followed by the posterior shoulder and body.  male infant delivered @ 1312.  There was a loose nuchal cord that swiftly reduced.  Infant was placed on the maternal abdomen and was stimulated and bulb suctioned.  Cord clamping was delayed x60 seconds then the cord was clamped x2 and cut.  Infant APGARs 8 and 9 and 1 and 5 minutes, respectively.  Birth weight pending.  Cord gases were not sent.  IV pitocin bolus started.  Placenta delivered spontaneously intact at 1320. 2 Vessel cord.  The vagina and perineum were examined revealing 1st degree perineal laceration. Closed with 3-0 vicryl in the usual fashion with excellent hemostasis.  The uterus was firm with IV pitocin and fundal massage.  Pt and infant left bonding in LDR.      Anesthesia - Epidural  Sponge and needle counts correct.  Patient tolerated procedure well.    Anticipate routine postparum care.      Brent Trent M.D.  [facility]  PGY-1

## 2024-07-20 NOTE — H&P
Admission History and Physical      Tuyet Rojo is a 38 y.o. female  at 39w5d who presents for scheduled IOL d/t AMA    She has had one  section for fetal distress and one successful  in  at [facility]. This complication has been complicated by AMA and a two-vessel umbilical cord. She also had a LEEP performed in 2024. She has received early and consistent prenatal care through [facility] and [facility].     She was noted to be breech at 37 weeks then spontaneously vertex to vertex at 38 weeks. An US was performed today in clinic to confirm vertex presentation.    She was seen in the office today and had a cervical sweep performed. She was noted to be 1.5 cm    Subjective:   denies contractions, reports some cramping since sweep at visit  denies pain  denies leaking of fluid  denies vaginal bleeding.   reports fetal movement.     ROS: Pertinent items are noted in HPI.    Past Medical History:   Diagnosis Date    History of loop electrical excision procedure (LEEP) 2023     Past Surgical History:   Procedure Laterality Date    PRIMARY C SECTION  2019     [facility]     OB History    Para Term  AB Living   3 2 2     2   SAB IAB Ectopic Molar Multiple Live Births           0 2      # Outcome Date GA Lbr Matt/2nd Weight Sex Delivery Anes PTL Lv   3 Current            2 Term 22 39w3d / 00:50 7 lb 8.5 oz M  EPI N DEN   1 Term 19 39w0d  5 lb F CS-Unspec EPI N DEN      Birth Comments: C/section due to fetal distress     Social History     Socioeconomic History    Marital status: Single     Spouse name: Not on file    Number of children: Not on file    Years of education: Not on file    Highest education level: Not on file   Occupational History    Not on file   Tobacco Use    Smoking status: Former    Smokeless tobacco: Never   Vaping Use    Vaping status: Former   Substance and Sexual Activity    Alcohol use: Not Currently    Drug use: Never     Sexual activity: Not Currently     Partners: Male     Birth control/protection: Pill, Condom   Other Topics Concern    Not on file   Social History Narrative    Not on file     Social Determinants of Health     Financial Resource Strain: Not on file   Food Insecurity: Not on file   Transportation Needs: Not on file   Physical Activity: Not on file   Stress: Not on file   Social Connections: Not on file   Intimate Partner Violence: Not on file   Housing Stability: Not on file     Allergies: Penicillins and Ketorolac  No current facility-administered medications for this encounter.    Prenatal care with [facility] starting at 17weeks with following problems:  Patient Active Problem List    Diagnosis Date Noted    Two vessel cord 2024    History of  2024    Multigravida of advanced maternal age in third trimester 2024    History of loop electrical excision procedure (LEEP) 2023       Last US at 32 weeks, EFW 33%ile      Objective:      There were no vitals taken for this visit.    General:   no acute distress   Skin:   normal   HEENT:  PERRLA   Lungs:   CTA bilateral   Heart:   S1, S2 normal, no murmur, click, rub or gallop, regular rate and rhythm, no JVD, no hepatosplenomegaly   Abdomen:   gravid, NT   EFW:  7   Pelvis:  adequate   FHT:  140 BPM   Uterine Size: S=D   Presentations: Cephalic   Cervix:     Dilation: 2cm    Effacement: 50%    Station:  -2    Consistency: Medium    Position: Middle     Lab Review  Lab:   Blood type: A     Recent Results (from the past 5880 hour(s))   Chlamydia/GC, PCR (Genital/Anal swab)    Collection Time: 24  9:43 AM    Specimen: Genital   Result Value Ref Range    C. trachomatis by PCR Negative Negative    N. gonorrhoeae by PCR Negative Negative    Source Genital    VAGINAL PATHOGENS DNA PANEL    Collection Time: 24  9:43 AM   Result Value Ref Range    Candida species DNA Probe Negative Negative    Trichamonas vaginalis DNA Probe Negative Negative     Gardnerella vaginalis DNA Probe POSITIVE (A) Negative   AFP MATERNAL SERUM ALPHA-FETOPROTEIN    Collection Time: 02/26/24 10:36 AM   Result Value Ref Range    AFP Value -Eia 71 ng/mL    AFP MOM Value 1.57     Interpretation Screen Neg     [age] EM 38.7 yr    Maternal Weight 187.0 lbs.     Gest. Age on Collection Date 19 wks, 1 days     Gestational Age Based On Ultrasound     Multiple Pregnancy Arias     Race Nonblack     Insulin Dependent Diabetes No     Smoking No     Family Hx NTD No     Specimen See Note    URINE DRUG SCREEN W/CONF (AR)    Collection Time: 02/26/24 10:36 AM   Result Value Ref Range    Urine Amphetamine-Methamphetam Negative Cutoff 300 ng/mL    Barbiturates Negative Cutoff 200 ng/mL    Benzodiazepines Negative Cutoff 200 ng/mL    Propoxyphene Negative Cutoff 300 ng/mL    Cocaine Metabolite Negative Cutoff 150 ng/mL    Methadone Negative Cutoff 150 ng/mL    Codeine-Morphine Negative Cutoff 300 ng/mL    Phencyclidine -Pcp Negative Cutoff 25 ng/mL    Cannabinoid Metab Negative Cutoff 50 ng/mL    Creatinine Urine 177.7 20.0 - 400.0 mg/dL    Drug Comment Urine Drugs See Note    PREG CNTR PRENATAL PN    Collection Time: 02/26/24 10:36 AM   Result Value Ref Range    WBC 7.9 4.8 - 10.8 K/uL    RBC 4.25 4.20 - 5.40 M/uL    Hemoglobin 13.3 12.0 - 16.0 g/dL    Hematocrit 37.6 37.0 - 47.0 %    MCV 88.5 81.4 - 97.8 fL    MCH 31.3 27.0 - 33.0 pg    MCHC 35.4 32.2 - 35.5 g/dL    RDW 41.6 35.9 - 50.0 fL    Platelet Count 206 164 - 446 K/uL    MPV 11.8 9.0 - 12.9 fL    Hepatitis C Antibody Non-Reactive Non-Reactive    Hepatitis B Surface Antigen Non-Reactive Non-Reactive    Rubella IgG Antibody 42.10 IU/mL    Syphilis, Treponemal Qual Non-Reactive Non-Reactive   URINE CULTURE(NEW)    Collection Time: 02/26/24 10:36 AM    Specimen: Urine   Result Value Ref Range    Significant Indicator NEG     Source UR     Site Clean Catch     Culture Result Usual urogenital darrell >100,000 cfu/mL    HIV AG/AB COMBO  ASSAY SCREENING    Collection Time: 02/26/24 10:36 AM   Result Value Ref Range    HIV Ag/Ab Combo Assay Non-Reactive Non Reactive   OP Prenatal Panel-Blood Bank    Collection Time: 02/26/24 10:41 AM   Result Value Ref Range    ABO Grouping Only A     Rh Grouping Only POS     Antibody Screen Scrn NEG    PANORAMA PRENATAL TEST    Collection Time: 03/04/24  4:18 PM   Result Value Ref Range    REPORT SUMMARY LOW RISK     REPORT NOTE See Notes     GENDER OF FETUS Male     FETAL FRACTION 6.4%     TRISOMY 21 RESULT TEXT Low Risk     TRISOMY 21 AGE-BASED RISK TEXT 1/185 (0.54%)     TRISOMY 21 RISK SCORE TEXT <1/10,000 (<0.01%)     TRISOMY 18 RESULT TEXT Low Risk     TRISOMY 18 AGE-BASED RISK TEXT 1/698 (0.14%)     TRISOMY 18 RISK SCORE TEXT <1/10,000 (<0.01%)     TRISOMY 13 RESULT TEXT Low Risk     TRISOMY 13 AGE-BASED RISK TEXT 1/2,090 (0.05%)     TRISOMY 13 RISK SCORE TEXT <1/10,000 (<0.01%)     MONOSOMY X RESULT TEXT Low Risk     MONOSOMY X AGE-BASED RISK TEXT 1/568 (0.18%)     MONOSOMY X RISK SCORE TEXT <1/10,000 (<0.01%)     TRIPLOIDY RESULT TEXT Low Risk     22Q11.2 DELETION SYNDROME RESULT TEXT Low Risk     22Q11.2 DELETION SYNDROME POPULATION-BASED RISK TEXT 1/2,000     22Q11.2 DELETION SYNDROME RISK SCORE TEXT 1/3,100     1P36 DELETION SYNDROME RESULT TEXT Low Risk     1P36 DELETION SYNDROME POPULATION-BASED RISK TEXT 1/5,000     1P36 DELETION SYNDROME RISK SCORE TEXT 1/6,300     ANGELMAN SYNDROME RESULT TEXT Risk Unchanged     ANGELMAN SYNDROME POPULATION-BASED RISK TEXT 1/12,000     ANGELMAN SYNDROME RISK SCORE TEXT 1/12,000     CRI-DU-CHAT SYNDROME RESULT TEXT Low Risk     CRI-DU-CHAT SYNDROME POPULATION-BASED RISK TEXT 1/20,000     CRI-DU-CHAT SYNDROME RISK SCORE TEXT 1/27,000     PRADER-WILLI SYNDROME RESULT TEXT Low Risk     PRADER-WILLI SYNDROME POPULATION-BASED RISK TEXT 1/10,000     PRADER-WILLI SYNDROME RISK SCORE TEXT 1/13,800     FOOTNOTES See Notes    T.PALLIDUM AB JASPAL (SCREENING)    Collection Time:  04/15/24  9:05 AM   Result Value Ref Range    Syphilis, Treponemal Qual Non-Reactive Non-Reactive   GLUCOSE 1HR GESTATIONAL    Collection Time: 04/15/24  9:05 AM   Result Value Ref Range    Glucose, Post Dose 150 (H) 70 - 139 mg/dL   CBC WITHOUT DIFFERENTIAL    Collection Time: 04/15/24  9:05 AM   Result Value Ref Range    WBC 8.5 4.8 - 10.8 K/uL    RBC 4.01 (L) 4.20 - 5.40 M/uL    Hemoglobin 12.3 12.0 - 16.0 g/dL    Hematocrit 37.4 37.0 - 47.0 %    MCV 93.3 81.4 - 97.8 fL    MCH 30.7 27.0 - 33.0 pg    MCHC 32.9 32.2 - 35.5 g/dL    RDW 45.2 35.9 - 50.0 fL    Platelet Count 225 164 - 446 K/uL    MPV 11.0 9.0 - 12.9 fL   TSH    Collection Time: 04/15/24  9:05 AM   Result Value Ref Range    TSH 1.790 0.380 - 5.330 uIU/mL   FREE THYROXINE    Collection Time: 04/15/24  9:05 AM   Result Value Ref Range    Free T-4 0.89 (L) 0.93 - 1.70 ng/dL   GTT  (GESTATIONAL GLUCOSE 3 HR)    Collection Time: 04/17/24  8:42 AM   Result Value Ref Range    Baseline Glucose 85 65 - 95 mg/dL    Glucose 1 Hour 161 65 - 180 mg/dL    Glucose 2 Hour 152 65 - 155 mg/dL    Glucose 3 Hour 134 65 - 140 mg/dL   POCT urinalysis device results    Collection Time: 05/08/24 11:17 AM   Result Value Ref Range    POC Color Yellow     POC Appearance Clear     POC Glucose Negative Negative mg/dL    POC Ketones Negative Negative mg/dL    POC Specific Gravity 1.025 1.005 - 1.030    POC Blood Negative Negative    POC Urine PH 7.0 5.0 - 8.0    POC Protein 30 (A) Negative mg/dL    POC Nitrites Negative Negative    POC Leukocyte Esterase Negative Negative   CULTURE STOOL    Collection Time: 05/21/24  8:00 AM    Specimen: Stool   Result Value Ref Range    Significant Indicator NEG     Source STL     Site -     Culture Result       No enteric pathogens isolated.  NOTE:  Stool cultures are screened for Shiga Toxins 1 and 2,  Salmonella, Shigella, Campylobacter, Aeromonas,  Plesiomonas, and Vibrio.      EHEC Negative for Shiga Toxin 1 and 2.    EHEC(Shiga  Toxin)Detection    Collection Time: 24  8:00 AM    Specimen: Stool   Result Value Ref Range    Significant Indicator NEG     Source STL     Site -     EHEC Negative for Shiga Toxin 1 and 2.    GRP B STREP, BY PCR (RAYMOND BROTH)    Collection Time: 24 11:09 AM    Specimen: Genital   Result Value Ref Range    Strep Gp B DNA PCR Negative Negative          Assessment:   Tuyet Rojo at 39w5d  Labor status: Not in labor.  Obstetrical history significant for   Patient Active Problem List    Diagnosis Date Noted    Two vessel cord 2024    History of  2024    Multigravida of advanced maternal age in third trimester 2024    History of loop electrical excision procedure (LEEP) 2023   .      Plan:     1. Admit to L&D  2. GBS negative   3. Desires epidural for pain relief.    4. Plan at this time is start induction with Cooks balloon and pitocin . Consider AROM for augmentation if appropriate.       JACOB Viramontes MD Attending

## 2024-07-20 NOTE — PROGRESS NOTES
"S: Patient reports feeling strong contraction. Reports leaking of fluid with each contraction.     O: /70   Pulse 100   Temp 35.9 °C (96.7 °F) (Temporal)   Resp 16   Ht 5' 6\"   Wt 203 lb   LMP 10/27/2023 (Approximate)   SpO2 97%   BMI 32.77 kg/m²            FHTs:  Baseline 140, variability: moderate, accels: present, decels: absent        [place]: Contractions q 3 minutes        SVE: 6/90/-2, SROM with clear fluid     A/P:    1.   @ 39w2d here for IOL  2.  Previous CS x1 with successful  x1  3.  Cat 1 FHTs   4.  SROM noted at approximately 0500. Planning IV pain medication, pt desires epidural. Anticipate     Kerri Schilling CNM   "

## 2024-07-20 NOTE — PROGRESS NOTES
Patient presenting to labor and delivery for scheduled induction. Admission profile completed. Patient denies VB, LOF, HA, vision changes, pain. Patieent reports active fetal movement and occasional mild contractions. Discussed plan of care with patient and significant other. IV started and labs collected. Oriented patient to unit, discussed how to use bed controls and call light. Kerri Schilling CNM at bedside to insert cooks balloon, patient tolerated well. Pitocin started per MD orders see MAR.     0244: Kerri LION CNM notified about patients cooks balloon out and SVE.    0700: Report given to Asuncion KEARNEY, discussed POC, relinquished care of patient at this time.

## 2024-07-20 NOTE — CARE PLAN
The patient is Stable - Low risk of patient condition declining or worsening    Shift Goals  Clinical Goals: cervical dilation and effacement  Patient Goals: healthy mom and healthy baby  Family Goals: support    Progress made toward(s) clinical / shift goals:    Problem: Knowledge Deficit - L&D  Goal: Patient and family/caregivers will demonstrate understanding of plan of care, disease process/condition, diagnostic tests and medications  Outcome: Progressing  Note: Oriented patient to unit, discussed bed controls and call light. Educated patient on labor and delivery process. Discussed POC with patient and support persons, patient agrees to and verbalized understanding of POC. All questions answered at this time.      Problem: Risk for Infection and Impaired Wound Healing  Goal: Patient will remain free from infection  Outcome: Progressing  Note: Patient remains afebrile at this time, no signs or symptoms of infection.       Patient is not progressing towards the following goals:

## 2024-07-20 NOTE — PROGRESS NOTES
0700-report received from Gabriela KEARNEY. Pt requesting epidural. Dr Orlando notified.  iPad utilized for procedure and pt assessment.  0800-pt comfortable with epidural.   1312- delivery, APGARs 8/9.   1630-pt up to bathroom, able to void.  1700-pt transferred to  in stable condition. Report to Patrick KEARNEY

## 2024-07-21 ENCOUNTER — PHARMACY VISIT (OUTPATIENT)
Dept: PHARMACY | Facility: MEDICAL CENTER | Age: 39
End: 2024-07-21
Payer: COMMERCIAL

## 2024-07-21 VITALS
WEIGHT: 203 LBS | RESPIRATION RATE: 18 BRPM | DIASTOLIC BLOOD PRESSURE: 60 MMHG | TEMPERATURE: 97.8 F | OXYGEN SATURATION: 95 % | SYSTOLIC BLOOD PRESSURE: 90 MMHG | BODY MASS INDEX: 32.62 KG/M2 | HEART RATE: 83 BPM | HEIGHT: 66 IN

## 2024-07-21 LAB
ERYTHROCYTE [DISTWIDTH] IN BLOOD BY AUTOMATED COUNT: 43.2 FL (ref 35.9–50)
HCT VFR BLD AUTO: 30.2 % (ref 37–47)
HGB BLD-MCNC: 10.2 G/DL (ref 12–16)
MCH RBC QN AUTO: 28.9 PG (ref 27–33)
MCHC RBC AUTO-ENTMCNC: 33.8 G/DL (ref 32.2–35.5)
MCV RBC AUTO: 85.6 FL (ref 81.4–97.8)
PLATELET # BLD AUTO: 173 K/UL (ref 164–446)
PMV BLD AUTO: 11.8 FL (ref 9–12.9)
RBC # BLD AUTO: 3.53 M/UL (ref 4.2–5.4)
WBC # BLD AUTO: 12 K/UL (ref 4.8–10.8)

## 2024-07-21 PROCEDURE — A9270 NON-COVERED ITEM OR SERVICE: HCPCS

## 2024-07-21 PROCEDURE — RXMED WILLOW AMBULATORY MEDICATION CHARGE

## 2024-07-21 PROCEDURE — 85027 COMPLETE CBC AUTOMATED: CPT

## 2024-07-21 PROCEDURE — 36415 COLL VENOUS BLD VENIPUNCTURE: CPT

## 2024-07-21 PROCEDURE — 700102 HCHG RX REV CODE 250 W/ 637 OVERRIDE(OP)

## 2024-07-21 RX ORDER — PSEUDOEPHEDRINE HCL 30 MG
100 TABLET ORAL 2 TIMES DAILY PRN
Qty: 60 CAPSULE | Refills: 0 | Status: SHIPPED | OUTPATIENT
Start: 2024-07-21

## 2024-07-21 RX ORDER — IBUPROFEN 800 MG/1
800 TABLET, FILM COATED ORAL EVERY 8 HOURS PRN
Qty: 60 TABLET | Refills: 0 | Status: SHIPPED | OUTPATIENT
Start: 2024-07-21

## 2024-07-21 RX ORDER — ACETAMINOPHEN 500 MG
1000 TABLET ORAL EVERY 6 HOURS PRN
Qty: 60 TABLET | Refills: 0 | Status: SHIPPED | OUTPATIENT
Start: 2024-07-21

## 2024-07-21 RX ADMIN — ACETAMINOPHEN 1000 MG: 500 TABLET ORAL at 01:31

## 2024-07-21 ASSESSMENT — PAIN DESCRIPTION - PAIN TYPE
TYPE: ACUTE PAIN

## 2024-07-21 ASSESSMENT — EDINBURGH POSTNATAL DEPRESSION SCALE (EPDS)
I HAVE LOOKED FORWARD WITH ENJOYMENT TO THINGS: AS MUCH AS I EVER DID
I HAVE FELT SAD OR MISERABLE: NOT VERY OFTEN
I HAVE BEEN ANXIOUS OR WORRIED FOR NO GOOD REASON: HARDLY EVER
THINGS HAVE BEEN GETTING ON TOP OF ME: NO, I HAVE BEEN COPING AS WELL AS EVER
I HAVE FELT SCARED OR PANICKY FOR NO GOOD REASON: NO, NOT AT ALL
I HAVE BLAMED MYSELF UNNECESSARILY WHEN THINGS WENT WRONG: NO, NEVER
I HAVE BEEN SO UNHAPPY THAT I HAVE BEEN CRYING: ONLY OCCASIONALLY
I HAVE BEEN SO UNHAPPY THAT I HAVE HAD DIFFICULTY SLEEPING: NOT VERY OFTEN
THE THOUGHT OF HARMING MYSELF HAS OCCURRED TO ME: NEVER
I HAVE BEEN ABLE TO LAUGH AND SEE THE FUNNY SIDE OF THINGS: AS MUCH AS I ALWAYS COULD

## 2024-07-21 NOTE — DISCHARGE SUMMARY
Discharge Summary:     Date of Admission: 2024  Date of Discharge: 24      Admitting diagnosis:    1. Pregnancy @ 39w6d  2. AMA  3. H/o cesarian section, 1x successful   4. H/o LEEP      Discharge Diagnosis:   1. Status post successful  on .  2. H/o LEEP    Past Medical History:   Diagnosis Date    History of loop electrical excision procedure (LEEP) 2023     OB History    Para Term  AB Living   3 3 3     3   SAB IAB Ectopic Molar Multiple Live Births           0 3      # Outcome Date GA Lbr Matt/2nd Weight Sex Delivery Anes PTL Lv   3 Term 24 39w6d / 00:12 3.625 kg (7 lb 15.9 oz) M  EPI N DEN   2 Term 22 39w3d / 00:50 3.415 kg (7 lb 8.5 oz) M  EPI N DEN   1 Term 19 39w0d  2.268 kg (5 lb) F CS-Unspec EPI N DEN      Birth Comments: C/section due to fetal distress     Past Surgical History:   Procedure Laterality Date    PRIMARY C SECTION  2019     [place]     Penicillins and Ketorolac    Patient Active Problem List   Diagnosis    History of     History of loop electrical excision procedure (LEEP)    Multigravida of advanced maternal age in third trimester    Two vessel cord       Hospital Course:   Pt is a 38 y.o. now  who presented for IOL d/t AMA. She elected to TOLAC after having 1x successful . She also had history of LEEP in .     Labor induction performed with SROM. Epidural anesthesia was utilized with good effect on pain.  male infant delivered @ 1312.  There was a loose nuchal cord that swiftly reduced.  Infant was placed on the maternal abdomen and was stimulated and bulb suctioned.  Cord clamping was delayed x60 seconds then the cord was clamped x2 and cut.  Infant APGARs 8 and 9 and 1 and 5 minutes, respectively. 2 vessel cord noted. EBL 200cc.     Postpartum course notable for early ambulation, well managed pain, tolerance of diet, spontaneous voiding, and appropriate feeding of infant. She has remained  afebrile and blood pressure has been well controlled. All maternal questions and concerns addressed        Physical Exam:  Temp:  [36.3 °C (97.4 °F)-36.8 °C (98.2 °F)] 36.6 °C (97.8 °F)  Pulse:  [] 83  Resp:  [18] 18  BP: ()/(51-83) 90/60  SpO2:  [88 %-95 %] 95 %  Physical Exam  General: well  Chest/Breasts: nipples intact and breasts soft   Abdomen: obese, soft, non-distended  Fundus: firm and below umbilicus  Incision: not applicable, (vaginal delivery)  Perineum: well approximated and well healing  Extremities: symmetric and no edema, calves nontender    Current Facility-Administered Medications   Medication Dose    lactated ringers infusion      docusate sodium (Colace) capsule 100 mg  100 mg    ibuprofen (Motrin) tablet 800 mg  800 mg    acetaminophen (Tylenol) tablet 1,000 mg  1,000 mg    tetanus-dipth-acell pertussis (Tdap) inj 0.5 mL  0.5 mL    measles, mumps and rubella vaccine (Mmr) injection 0.5 mL  0.5 mL    oxytocin (Pitocin) infusion (for post delivery)  125 mL/hr       Recent Labs     07/19/24  2135 07/21/24  0238   WBC 7.8 12.0*   RBC 3.91* 3.53*   HEMOGLOBIN 11.2* 10.2*   HEMATOCRIT 33.0* 30.2*   MCV 84.4 85.6   MCH 28.6 28.9   MCHC 33.9 33.8   RDW 42.6 43.2   PLATELETCT 203 173   MPV 12.0 11.8         Activity/ Discharge Instructions::   Discharge to home  Pelvic Rest x 6 weeks  No heavy lifting x4 weeks  Call or come to ED for: heavy vaginal bleeding, fever >100.4, severe abdominal pain, severe headache, chest pain, shortness of breath,  N/V, incisional drainage, or other concerns.       Follow up:  [facility] in 5 weeks for vaginal delivery;     Discharge Meds:   Current Outpatient Medications   Medication Sig Dispense Refill    acetaminophen (TYLENOL) 500 MG Tab Take 2 Tablets by mouth every 6 hours as needed for Mild Pain. 60 Tablet 0    docusate sodium 100 MG Cap Take 100 mg by mouth 2 times a day as needed for Constipation. 60 Capsule 0    ibuprofen (MOTRIN) 800 MG Tab  Take 1 Tablet by mouth every 8 hours as needed for Moderate Pain. 60 Tablet 0       Brent Trent M.D.

## 2024-07-21 NOTE — CARE PLAN
Problem: Pain - Standard  Goal: Alleviation of pain or a reduction in pain to the patient’s comfort goal  Outcome: Progressing     Problem: Altered Physiologic Condition  Goal: Patient physiologically stable as evidenced by normal lochia, palpable uterine involution and vitals within normal limits  Outcome: Progressing     Problem: Infection - Postpartum  Goal: Postpartum patient will be free of signs and symptoms of infection  Outcome: Progressing     The patient is Watcher - Medium risk of patient condition declining or worsening    Shift Goals  Clinical Goals: Pain control, lochia WDL  Patient Goals: healthy mom and healthy baby  Family Goals: support    Progress made toward(s) clinical / shift goals:  Pain well controlled with non-narcotic analgesia, pt is able to ambulate and care for self and infant without difficulty. Lochia remains light without clots expressed, performing dalton care and pad changes independently. No s/s infection noted on assessment, remains afebrile.    Patient is not progressing towards the following goals: NA

## 2024-07-21 NOTE — LACTATION NOTE
Ecuadorean SPEAKING;  #[id number], LIBERTADMARGARET Benz is a 38 year old . She delivered vaginally on  at 1312. Her new baby boy, (undecided on first name), was born at 39+6 weeks and weighed 3625 grams/ 7 lbs. 15.9 ounces. Current weight is 3600 grams/ 7 lbs 15 ounces, (-0.7%).    Tuyet nursed her 5 year old for 4 months and her 21 month old for one year. Her  nursed last pm at 2100 but has been sleepy and unable to latch.    We un-swaddled him and mom hand expressed colostrum. He woke and latched deeply with frequent audible swallows. Mom supported him at the left breast in football hold. Tuyet denied discomfort during feeding.    Tuyet is planning to discharge today. She states she feels confident nursing baby. Discussed normal  feeding patterns as well as second night cluster feedings.     See Assessment in 's chart.   keytruda ordersStart after RT- which she finishes on 2/27/2020

## 2024-07-21 NOTE — PROGRESS NOTES
1700 - Patient transferred from labor and delivery in wheelchair with infant in arms and FOB accompanying with belongings. Two RN verification of infant and parent armbands. Report received from MARIO Roland RN. Patient oriented to unit and POC. Assessment completed. Pain management discussed. Patient declines intervention for pain at this time. Will call for PRN pain medication. Pitocin infusing at 125 ml/hr.Parents educated to offer feedings on cue, at minimum of Q3 hours. Parents verbalize understanding and will update infant I&O chart. Parents educated on room, medications, safe sleep, emergency cord, and infant care. Patient encouraged to call with needs. Call light within reach. All concerns and questions answered at this time.

## 2024-07-21 NOTE — DISCHARGE INSTRUCTIONS

## 2024-07-21 NOTE — PROGRESS NOTES
Bedside report received from dayshift RN. 12hr chart check complete, MAR and orders reviewed. Pt awake and alert, up independently to restroom, SO at bedside for support, call light within reach.

## 2024-07-21 NOTE — PROGRESS NOTES
0800 - Received report from Jluis KEARNEY at change of shift. Assumed care. Assessment completed. Fundus firm, lochia scant. Plan of care reviewed. Pt will call if med intervention needed. Educated parents to offer feedings on cue, at minimum of Q3 hours and to update I&O chart. Educated parents on safe sleep and importance of keeping infant dressed and swaddled. Parents verbalized understanding. Encouraged parents to call for assistance when needed. Call light within reach. All questions and concerns answered at this time.     1340 - Discharge paperwork for pt and infant discussed with parents at bedside. All questions answered. Follow up appointment to be made by patient. NBS #2 dates given to parents. Parents verbalize understanding. Paperwork signed and dated at this time.     1404 - Infant discharged in car seat with parents. Infant placed in car seat by parents and checked by RN. Bands verified. Cuddles removed. Patient escorted off unit in wheelchair with Rossana EVANGELISTA.

## 2024-08-29 ENCOUNTER — HOSPITAL ENCOUNTER (OUTPATIENT)
Facility: MEDICAL CENTER | Age: 39
End: 2024-08-29
Attending: PHYSICIAN ASSISTANT
Payer: COMMERCIAL

## 2024-08-29 ENCOUNTER — POST PARTUM (OUTPATIENT)
Dept: OBGYN | Facility: CLINIC | Age: 39
End: 2024-08-29

## 2024-08-29 VITALS — DIASTOLIC BLOOD PRESSURE: 74 MMHG | BODY MASS INDEX: 30.63 KG/M2 | WEIGHT: 189.8 LBS | SYSTOLIC BLOOD PRESSURE: 112 MMHG

## 2024-08-29 DIAGNOSIS — Z87.42 HISTORY OF ABNORMAL CERVICAL PAP SMEAR: ICD-10-CM

## 2024-08-29 PROBLEM — O09.523 MULTIGRAVIDA OF ADVANCED MATERNAL AGE IN THIRD TRIMESTER: Status: RESOLVED | Noted: 2024-02-26 | Resolved: 2024-08-29

## 2024-08-29 PROBLEM — Q27.0 TWO VESSEL CORD: Status: RESOLVED | Noted: 2024-03-21 | Resolved: 2024-08-29

## 2024-08-29 ASSESSMENT — EDINBURGH POSTNATAL DEPRESSION SCALE (EPDS)
THE THOUGHT OF HARMING MYSELF HAS OCCURRED TO ME: NEVER
THINGS HAVE BEEN GETTING ON TOP OF ME: NO, I HAVE BEEN COPING AS WELL AS EVER
I HAVE BEEN SO UNHAPPY THAT I HAVE BEEN CRYING: ONLY OCCASIONALLY
I HAVE FELT SAD OR MISERABLE: NOT VERY OFTEN
I HAVE BEEN SO UNHAPPY THAT I HAVE HAD DIFFICULTY SLEEPING: NOT VERY OFTEN
I HAVE FELT SCARED OR PANICKY FOR NO GOOD REASON: NO, NOT AT ALL
I HAVE BLAMED MYSELF UNNECESSARILY WHEN THINGS WENT WRONG: NO, NEVER
TOTAL SCORE: 3
I HAVE LOOKED FORWARD WITH ENJOYMENT TO THINGS: AS MUCH AS I EVER DID
I HAVE BEEN ANXIOUS OR WORRIED FOR NO GOOD REASON: NO, NOT AT ALL
I HAVE BEEN ABLE TO LAUGH AND SEE THE FUNNY SIDE OF THINGS: AS MUCH AS I ALWAYS COULD

## 2024-08-29 ASSESSMENT — LIFESTYLE VARIABLES: SUBSTANCE_ABUSE: 0

## 2024-08-29 ASSESSMENT — ENCOUNTER SYMPTOMS
DEPRESSION: 0
NERVOUS/ANXIOUS: 0

## 2024-08-29 NOTE — PROGRESS NOTES
Pt is here today for postpartum visit.   Delivery type :  24 w/ 1st degree laceration  Currently breast feeding   LMP : Still bleeding from delivery   BCM : Nexplanon  Last pap : 3/15/22 ASCUS w/ HPV (+), Colpo done 2022 w/ no biopsy   EPDS : 3  Pt states Still swollen on her belly , and is having lots of headaches   ID # [id number]

## 2024-08-29 NOTE — ASSESSMENT & PLAN NOTE
Orders:    THINPREP PAP WITH HPV; Future  - Diane emailed for jeff for Nexplanon vs IUD (If PAP wnl, due to hx LEEP)  - Erasmo exercises given

## 2024-08-29 NOTE — PROGRESS NOTES
"Subjective     Tuyet Rojo is a 38 y.o. female who presents with Postpartum visit today. Pt is 5 weeks s/p  without complications. Pt has no complaints- denies heavy vaginal bleeding, depression, intercourse, pain or problems with BF. PAP abnormal  - ASCUS, HPV+ but had LEEP after, then one normal PAP and one abnormal PAP - repeated today. BCM desired is Nexplanon or IUD - will email Diane for jeff today. Kegel exercises given, pt notes some burning at lac site, also \"inflammation\" in abd and HA - pt urged to incr water intake, try Kegels and perineal massage, Sitz baths, also abd exercises for support.            HPI    Review of Systems   Psychiatric/Behavioral:  Negative for depression and substance abuse. The patient is not nervous/anxious.    All other systems reviewed and are negative.             Objective     /74   Wt 189 lb 12.8 oz   LMP 10/27/2023 (Approximate)   BMI 30.63 kg/m²      Physical Exam  Vitals reviewed.   Constitutional:       Appearance: She is well-developed.   HENT:      Head: Normocephalic and atraumatic.   Eyes:      Pupils: Pupils are equal, round, and reactive to light.   Neck:      Thyroid: No thyromegaly.   Cardiovascular:      Rate and Rhythm: Normal rate and regular rhythm.      Heart sounds: Normal heart sounds.   Pulmonary:      Effort: Pulmonary effort is normal. No respiratory distress.      Breath sounds: Normal breath sounds.   Abdominal:      General: Bowel sounds are normal. There is no distension.      Palpations: Abdomen is soft.      Tenderness: There is no abdominal tenderness.   Genitourinary:     Exam position: Supine.      Labia:         Right: No rash or tenderness.         Left: No rash or tenderness.       Vagina: Normal. No signs of injury and foreign body. No vaginal discharge or erythema.      Cervix: No cervical motion tenderness.      Uterus: Not deviated, not enlarged and not tender.       Adnexa:         Right: No mass or " tenderness.          Left: No mass or tenderness.     Musculoskeletal:      Cervical back: Normal range of motion and neck supple.   Skin:     General: Skin is warm and dry.      Findings: No erythema.   Neurological:      Mental Status: She is alert.      Deep Tendon Reflexes: Reflexes are normal and symmetric.   Psychiatric:         Behavior: Behavior normal.         Thought Content: Thought content normal.                             Assessment & Plan        Assessment & Plan  Postpartum care following vaginal delivery    Orders:    THINPREP PAP WITH HPV; Future  - Diane emailed for jeff for Nexplanon vs IUD (If PAP wnl, due to hx LEEP)  - Kegel exercises given  History of abnormal cervical Pap smear

## 2024-09-11 ENCOUNTER — DOCUMENTATION (OUTPATIENT)
Dept: OBGYN | Facility: CLINIC | Age: 39
End: 2024-09-11
Payer: MEDICAID

## 2024-09-11 ENCOUNTER — TELEPHONE (OUTPATIENT)
Dept: OBGYN | Facility: CLINIC | Age: 39
End: 2024-09-11

## 2024-09-11 PROBLEM — R87.619 ABNORMAL PAP SMEAR OF CERVIX: Status: ACTIVE | Noted: 2024-09-11

## 2024-09-11 LAB
CYTOLOGIST CVX/VAG CYTO: ABNORMAL
CYTOLOGY CVX/VAG DOC CYTO: ABNORMAL
CYTOLOGY CVX/VAG DOC THIN PREP: ABNORMAL
HPV I/H RISK 4 DNA CVX QL PROBE+SIG AMP: POSITIVE
HPV16 DNA CVX QL PROBE+SIG AMP: POSITIVE
HPV18+45 E6+E7 MRNA CVX QL NAA+PROBE: NEGATIVE
NOTE NL11727A: ABNORMAL
OTHER STN SPEC: ABNORMAL
STAT OF ADQ CVX/VAG CYTO-IMP: ABNORMAL

## 2024-09-11 NOTE — PROGRESS NOTES
Pt walked into clinic to review pap results. Reviewed NILM, +HPV 16 with patient. Per MD Dany, pt needs colpo. Discussed procedure, pt prefers to go [facility] [place] d/t cost.     Kerri Schilling CNM

## 2024-09-20 ENCOUNTER — GYNECOLOGY VISIT (OUTPATIENT)
Dept: OBGYN | Facility: CLINIC | Age: 39
End: 2024-09-20
Payer: MEDICAID

## 2024-09-20 VITALS — DIASTOLIC BLOOD PRESSURE: 70 MMHG | BODY MASS INDEX: 30.67 KG/M2 | WEIGHT: 190 LBS | SYSTOLIC BLOOD PRESSURE: 110 MMHG

## 2024-09-20 DIAGNOSIS — Z97.5 NEXPLANON IN PLACE: ICD-10-CM

## 2024-09-20 DIAGNOSIS — Z30.017 NEXPLANON INSERTION: ICD-10-CM

## 2024-09-20 LAB
POCT INT CON NEG: NEGATIVE
POCT INT CON POS: POSITIVE
POCT URINE PREGNANCY TEST: NEGATIVE

## 2024-09-20 NOTE — PROGRESS NOTES
Pt here for NEX insertion     LMP- pt still has not gotten one yet  PAP- 8/29/24 (abnormal) pt is aware and knows she needs a colpo has appt with [facility] ( on Monday)   UPT- neg  Phone number- [phone] (home)   Pharmacy verified

## 2024-09-20 NOTE — PROGRESS NOTES
Procedure note; Nexplanon insertion;    Informed consent obtained, consent signed-discussed the risks of Nexplanon; pain, bleeding, infection, bruising, possible pregnancy, difficulty with removing implant, possible scarring to arm.   All the risks and benefits of the procedure and the device are explained and patient verbalizes understanding and signs the consent     Urine hCG negative    Patient positioned supine with nondominant arm exposed. (Right arm)    Medial epicondyle of left arm palpated    Surgical josiane placed 8-10 cm medial to the medial epicondyle    Betadine prep the skin    Local anesthesia-1% lidocaine injected using a 1 1/2 inch 27 gauge needle     Implant inserted via the Nexplanon insertion device subdermally in a sterile fashion    The patient and provider can feel the implant under the skin and the blue end of the insertion device is present indicating implant insertion    Pressure bandage placed    Patient instructed to remove dressing  in 24 hours    Patient instructed to use a band-aid for 2 days there after    Patient tolerated the procedure well    Followup in 4-6  weeks for post insertion checkup       Nexplanon  NDC 70237-447-85  EXP: 10/2026  LOT: [id number]    Kerri Schilling CNM

## 2024-10-18 ENCOUNTER — GYNECOLOGY VISIT (OUTPATIENT)
Dept: OBGYN | Facility: CLINIC | Age: 39
End: 2024-10-18

## 2024-10-18 VITALS — BODY MASS INDEX: 30.51 KG/M2 | DIASTOLIC BLOOD PRESSURE: 60 MMHG | SYSTOLIC BLOOD PRESSURE: 110 MMHG | WEIGHT: 189 LBS

## 2024-10-18 DIAGNOSIS — Z97.5 NEXPLANON IN PLACE: ICD-10-CM

## 2024-10-18 PROCEDURE — 3074F SYST BP LT 130 MM HG: CPT

## 2024-10-18 PROCEDURE — 99212 OFFICE O/P EST SF 10 MIN: CPT

## 2024-10-18 PROCEDURE — 3078F DIAST BP <80 MM HG: CPT
